# Patient Record
Sex: MALE | Race: WHITE | Employment: FULL TIME | URBAN - METROPOLITAN AREA
[De-identification: names, ages, dates, MRNs, and addresses within clinical notes are randomized per-mention and may not be internally consistent; named-entity substitution may affect disease eponyms.]

---

## 2023-08-14 ENCOUNTER — HOSPITAL ENCOUNTER (EMERGENCY)
Age: 33
Discharge: HOME | End: 2023-08-14
Attending: EMERGENCY MEDICINE
Payer: COMMERCIAL

## 2023-08-14 VITALS
SYSTOLIC BLOOD PRESSURE: 122 MMHG | RESPIRATION RATE: 20 BRPM | HEIGHT: 69 IN | TEMPERATURE: 98.7 F | DIASTOLIC BLOOD PRESSURE: 72 MMHG | OXYGEN SATURATION: 98 % | BODY MASS INDEX: 25.21 KG/M2 | HEART RATE: 71 BPM | WEIGHT: 170.19 LBS

## 2023-08-14 DIAGNOSIS — L01.00 IMPETIGO: ICD-10-CM

## 2023-08-14 DIAGNOSIS — H66.90 CHRONIC OTITIS MEDIA, UNSPECIFIED OTITIS MEDIA TYPE: ICD-10-CM

## 2023-08-14 DIAGNOSIS — H60.391 INFECTIVE OTITIS EXTERNA OF RIGHT EAR: Primary | ICD-10-CM

## 2023-08-14 PROCEDURE — 99283 EMERGENCY DEPT VISIT LOW MDM: CPT

## 2023-08-14 PROCEDURE — 6370000000 HC RX 637 (ALT 250 FOR IP): Performed by: EMERGENCY MEDICINE

## 2023-08-14 RX ORDER — PREGABALIN 100 MG/1
100 CAPSULE ORAL 3 TIMES DAILY
COMMUNITY
Start: 2021-06-23

## 2023-08-14 RX ORDER — DOXYCYCLINE HYCLATE 100 MG
100 TABLET ORAL 2 TIMES DAILY
Qty: 20 TABLET | Refills: 0 | Status: SHIPPED | OUTPATIENT
Start: 2023-08-14 | End: 2023-08-24

## 2023-08-14 RX ORDER — BUPROPION HYDROCHLORIDE 150 MG/1
TABLET ORAL
COMMUNITY
Start: 2023-03-25

## 2023-08-14 RX ORDER — MUPIROCIN CALCIUM 20 MG/G
CREAM TOPICAL
Qty: 30 G | Refills: 0 | Status: SHIPPED | OUTPATIENT
Start: 2023-08-14 | End: 2023-09-13

## 2023-08-14 RX ORDER — NEOMYCIN SULFATE, POLYMYXIN B SULFATE AND HYDROCORTISONE 10; 3.5; 1 MG/ML; MG/ML; [USP'U]/ML
3 SUSPENSION/ DROPS AURICULAR (OTIC) ONCE
Status: COMPLETED | OUTPATIENT
Start: 2023-08-14 | End: 2023-08-14

## 2023-08-14 RX ORDER — PREGABALIN 25 MG/1
25 CAPSULE ORAL 3 TIMES DAILY
Qty: 90 CAPSULE | Refills: 11 | COMMUNITY
Start: 2023-05-24 | End: 2023-08-14

## 2023-08-14 RX ORDER — BUSPIRONE HYDROCHLORIDE 15 MG/1
15 TABLET ORAL 3 TIMES DAILY
COMMUNITY
Start: 2021-06-03

## 2023-08-14 RX ORDER — CIPROFLOXACIN 500 MG/1
500 TABLET, FILM COATED ORAL 2 TIMES DAILY
Qty: 20 TABLET | Refills: 0 | Status: SHIPPED | OUTPATIENT
Start: 2023-08-14 | End: 2023-08-24

## 2023-08-14 RX ORDER — CITALOPRAM 20 MG/1
20 TABLET ORAL DAILY
COMMUNITY
Start: 2023-05-24

## 2023-08-14 RX ADMIN — NEOMYCIN SULFATE, POLYMYXIN B SULFATE AND HYDROCORTISONE 3 DROP: 10; 3.5; 1 SUSPENSION/ DROPS AURICULAR (OTIC) at 12:25

## 2023-08-14 ASSESSMENT — PAIN DESCRIPTION - ORIENTATION: ORIENTATION: RIGHT

## 2023-08-14 ASSESSMENT — PAIN SCALES - GENERAL
PAINLEVEL_OUTOF10: 5
PAINLEVEL_OUTOF10: 5

## 2023-08-14 ASSESSMENT — PAIN - FUNCTIONAL ASSESSMENT
PAIN_FUNCTIONAL_ASSESSMENT: 0-10
PAIN_FUNCTIONAL_ASSESSMENT: 0-10

## 2023-08-14 ASSESSMENT — PAIN DESCRIPTION - LOCATION: LOCATION: EAR

## 2023-08-14 NOTE — ED NOTES
Discharge instructions reviewed. Patient verbalized understanding. Prescription sent to pharmacy.        Helder Carrera RN  08/14/23 [id number]

## 2023-08-14 NOTE — ED PROVIDER NOTES
[address] [facility] Name: Toña Carmona   MRN: [id number]   [address] [facility] 1990   Date of evaluation: 8/14/2023   Provider: Gustavo Pérez MD   PCP: Antony Melgar   Note Started: 12:01 PM EDT 8/14/23       CHIEF COMPLAINT  Otalgia (Right pain started 4 to 5 days ago.  ) and Other (Scab below left ear not healing. Popped zit about 1 week ago.)       HISTORY OF PRESENT ILLNESS  Toña Carmona is a 29 y.o. male who  has a past medical history of Anxiety, Chronic back pain, Depression, Hernia, Ilioinguinal neuralgia, and Migraine. who presents to the ED complaining of right-sided ear pain. Patient has had recurrent problems with bilateral ear pain chronically. He describes it starting after he went swimming. Also complaining of some sores just below his left ear. He does have a history of opioid dependence and is currently on Sublocade.     I have reviewed the following from the nursing documentation:        HISTORY :      Past Medical History:   Diagnosis Date    Anxiety     Chronic back pain     Depression     Hernia     groin    Ilioinguinal neuralgia 1/10/2013    Migraine      Past Surgical History:   Procedure Laterality Date    EYE SURGERY      [facility]/2008    [facility]    [facility]IA REPAIR  01/01/2007    [facility] Dr. Kelly Norton      Family History   Problem Relation Age of Onset    High Blood Pressure Mother     Migraines Mother     Depression Mother     Arthritis Mother     High Cholesterol Father     Asthma Father     Stroke Maternal Grandmother     Diabetes Paternal Grandmother      Social History     Socioeconomic History    Marital status: Legally      Spouse name: Not on file    Number of children: Not on file    Years of education: Not on file    Highest education level: Not on file   Occupational History    Not on file   Tobacco Use    Smoking status: Every Day     Packs/day: 1.00     Years: 5.00     Pack years: 5.00     Types: Cigarettes

## 2023-08-14 NOTE — ED TRIAGE NOTES
Patient came to ER with complaints of right ear pain that started 4 to 5 days ago. Patient also had a zit under left ear that he popped about 4 days ago that is not healing.

## 2023-08-14 NOTE — DISCHARGE INSTRUCTIONS
Use the eardrops I gave you 3 or 4 drops in the right ear 3-4 times a day for the next 10 days. Remember the ear wick needs to come out in 2 days. Continue the eardrops even after you take the ear wick out.

## 2023-12-08 ENCOUNTER — OFFICE VISIT (OUTPATIENT)
Age: 33
End: 2023-12-08

## 2023-12-08 VITALS
TEMPERATURE: 98.2 F | SYSTOLIC BLOOD PRESSURE: 121 MMHG | DIASTOLIC BLOOD PRESSURE: 74 MMHG | BODY MASS INDEX: 24.66 KG/M2 | OXYGEN SATURATION: 95 % | WEIGHT: 167 LBS | HEART RATE: 88 BPM

## 2023-12-08 DIAGNOSIS — R10.84 GENERALIZED ABDOMINAL PAIN: ICD-10-CM

## 2023-12-08 DIAGNOSIS — R11.2 NAUSEA AND VOMITING, UNSPECIFIED VOMITING TYPE: Primary | ICD-10-CM

## 2023-12-08 RX ORDER — ONDANSETRON 4 MG/1
4 TABLET, FILM COATED ORAL 3 TIMES DAILY PRN
Qty: 15 TABLET | Refills: 0 | Status: SHIPPED | OUTPATIENT
Start: 2023-12-08

## 2023-12-08 ASSESSMENT — ENCOUNTER SYMPTOMS
SORE THROAT: 0
DIARRHEA: 0
COUGH: 0
WHEEZING: 0
VOMITING: 1
TROUBLE SWALLOWING: 0
NAUSEA: 1
CONSTIPATION: 0
ABDOMINAL PAIN: 0